# Patient Record
Sex: FEMALE | Race: WHITE | NOT HISPANIC OR LATINO
[De-identification: names, ages, dates, MRNs, and addresses within clinical notes are randomized per-mention and may not be internally consistent; named-entity substitution may affect disease eponyms.]

---

## 2019-04-08 ENCOUNTER — APPOINTMENT (OUTPATIENT)
Dept: OTOLARYNGOLOGY | Facility: CLINIC | Age: 74
End: 2019-04-08
Payer: MEDICARE

## 2019-04-08 VITALS
HEART RATE: 51 BPM | BODY MASS INDEX: 23.79 KG/M2 | HEIGHT: 61 IN | DIASTOLIC BLOOD PRESSURE: 73 MMHG | WEIGHT: 126 LBS | SYSTOLIC BLOOD PRESSURE: 131 MMHG

## 2019-04-08 DIAGNOSIS — Z87.891 PERSONAL HISTORY OF NICOTINE DEPENDENCE: ICD-10-CM

## 2019-04-08 DIAGNOSIS — J30.2 OTHER SEASONAL ALLERGIC RHINITIS: ICD-10-CM

## 2019-04-08 DIAGNOSIS — Z86.39 PERSONAL HISTORY OF OTHER ENDOCRINE, NUTRITIONAL AND METABOLIC DISEASE: ICD-10-CM

## 2019-04-08 DIAGNOSIS — Z82.3 FAMILY HISTORY OF STROKE: ICD-10-CM

## 2019-04-08 DIAGNOSIS — Z87.09 PERSONAL HISTORY OF OTHER DISEASES OF THE RESPIRATORY SYSTEM: ICD-10-CM

## 2019-04-08 PROBLEM — Z00.00 ENCOUNTER FOR PREVENTIVE HEALTH EXAMINATION: Status: ACTIVE | Noted: 2019-04-08

## 2019-04-08 PROCEDURE — 99213 OFFICE O/P EST LOW 20 MIN: CPT | Mod: 25

## 2019-04-08 PROCEDURE — 31575 DIAGNOSTIC LARYNGOSCOPY: CPT

## 2019-04-08 RX ORDER — NYSTATIN 100000 U/G
100000 OINTMENT TOPICAL
Qty: 30 | Refills: 0 | Status: ACTIVE | COMMUNITY
Start: 2019-01-23

## 2019-04-08 RX ORDER — SIMVASTATIN 20 MG/1
20 TABLET, FILM COATED ORAL
Qty: 90 | Refills: 0 | Status: ACTIVE | COMMUNITY
Start: 2019-02-21

## 2019-04-08 NOTE — REVIEW OF SYSTEMS
[Patient Intake Form Reviewed] : Patient intake form was reviewed
I have reviewed and confirmed nurses' notes for patient's medications, allergies, medical history, and surgical history.

## 2019-04-08 NOTE — CONSULT LETTER
[Dear  ___] : Dear  [unfilled], [Courtesy Letter:] : I had the pleasure of seeing your patient, [unfilled], in my office today. [Please see my note below.] : Please see my note below. [Consult Closing:] : Thank you very much for allowing me to participate in the care of this patient.  If you have any questions, please do not hesitate to contact me. [Sincerely,] : Sincerely, [FreeTextEntry3] : Kaylee Castellanos MD\par

## 2019-04-08 NOTE — ASSESSMENT
[FreeTextEntry1] : She has a left laryngeal lesion found incidentally on flexible laryngoscopy by Dr. León. She has no symptoms. It is a smooth approximately 1 cm lesion which is present on the left AE fold.  It may be a cyst or possibly lipoma.  We also discussed the small possibility of malignancy. there is no airway obstruction. She does have a history of acid reflux. She does have any symptoms at this time. She does have a history reflux of a laryngeal changes\par \par Plan\par -Findings and management options were discussed with the patient.\par -Reflux precautions and medication as needed\par -We discussed management options of the laryngeal lesion. The options were observation, imaging study, and biopsy/excision of the lesion. Although this is likely benign, malignancy cannot be ruled out without a biopsy. We  are going to start by obtaining a CT scan of the neck/larynx. I will see her back after the scan and we will  discuss further treatment.  \par -she should call and return urgently if she has any problems. \par

## 2019-04-08 NOTE — HISTORY OF PRESENT ILLNESS
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 It was found on exam by Dr. León. The patient denies voice change, throat pain, cough, dysphagia or bleeding.  She had a sinus drip when she saw Dr. León.  She does not sing or use her voice excessively.  She does not smoke.  She had reflux in the past but does not have any symptoms. She controls it with diet. 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

## 2019-05-06 ENCOUNTER — APPOINTMENT (OUTPATIENT)
Dept: OTOLARYNGOLOGY | Facility: CLINIC | Age: 74
End: 2019-05-06
Payer: MEDICARE

## 2019-05-06 VITALS
SYSTOLIC BLOOD PRESSURE: 151 MMHG | HEART RATE: 69 BPM | WEIGHT: 126 LBS | DIASTOLIC BLOOD PRESSURE: 86 MMHG | HEIGHT: 61 IN | BODY MASS INDEX: 23.79 KG/M2

## 2019-05-06 PROCEDURE — 31575 DIAGNOSTIC LARYNGOSCOPY: CPT

## 2019-05-06 PROCEDURE — 99213 OFFICE O/P EST LOW 20 MIN: CPT | Mod: 25

## 2019-05-06 RX ORDER — CALCIUM/PHOS/GENIST/D3/ZN/K 500MG-70MG
CAPSULE ORAL
Refills: 0 | Status: ACTIVE | COMMUNITY

## 2019-05-06 RX ORDER — SIMVASTATIN 20 MG/1
20 TABLET, FILM COATED ORAL
Refills: 0 | Status: DISCONTINUED | COMMUNITY
End: 2019-05-06

## 2019-05-06 RX ORDER — TRIAMCINOLONE ACETONIDE 1 MG/G
0.1 OINTMENT TOPICAL
Qty: 30 | Refills: 0 | Status: ACTIVE | COMMUNITY
Start: 2019-01-23

## 2019-05-06 NOTE — HISTORY OF PRESENT ILLNESS
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 It was found on exam by Dr. León. The patient denies voice change, throat pain, cough, dysphagia or bleeding.  She had a sinus drip when she saw Dr. León.  She does not sing or use her voice excessively.  She does not smoke.  She had reflux in the past but does not have any symptoms. She controls it with diet. 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 She had a CT scan. I reviewed the results with the patient and her daughter. There were no suspicious masses or lesions. There is a 7 mm cyst  along the posterior wall at the junction of the oropharynx and hypopharynx with benign features.  The thyroid gland was heterogeneous and enlarged.

## 2019-05-06 NOTE — CONSULT LETTER
[Courtesy Letter:] : I had the pleasure of seeing your patient, [unfilled], in my office today. [Dear  ___] : Dear  [unfilled], [Please see my note below.] : Please see my note below. [FreeTextEntry3] : Kaylee Castellanos MD\par  [Sincerely,] : Sincerely, [Consult Closing:] : Thank you very much for allowing me to participate in the care of this patient.  If you have any questions, please do not hesitate to contact me.

## 2019-05-06 NOTE — ASSESSMENT
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 There is no airway obstruction. She has symptoms due to reflux. CT scan showed no suspicious masses.\par \par Plan\par -Findings and management options were discussed with the patient and her daughter. \par -Reflux precautions and medication as needed\par -We discussed management options of the laryngeal lesion include observation, excision of the lesion in the OR and in-office biopsy. Although this is likely benign, malignancy cannot be ruled out without a biopsy. I reviewed some of the risks of observation which include set enlargement with airway obstruction obstruction and the small possibility of malignancy. She is going to think it over. I asked her to consider an in-office biopsy. I gave her the name of two physicians who perform this procedure.\par -follow up in approximately 2 months if she opts for observation. She was told to see an ENT where she will be (she will be out of the city) if she has any concerns. \par -She will speak with Dr. Beltre about repeating her TFTs.  consider ultrasound of the thyroid if needed. \par -she should call and return urgently if she has any problems. \par

## 2019-11-05 ENCOUNTER — APPOINTMENT (OUTPATIENT)
Dept: OTOLARYNGOLOGY | Facility: CLINIC | Age: 74
End: 2019-11-05
Payer: MEDICARE

## 2019-11-05 PROCEDURE — 31231 NASAL ENDOSCOPY DX: CPT

## 2019-11-05 PROCEDURE — 99213 OFFICE O/P EST LOW 20 MIN: CPT | Mod: 25

## 2019-11-05 NOTE — ASSESSMENT
[FreeTextEntry1] : She has an approximately 1 cm benign appearing left laryngeal cyst. Her airway is patent. Her exam is unchanged.\par \par She has chronic vasomotor rhinitis and reflux. She may also suffer from allergies. There is no obvious infection on exam. She also suffers from postnasal drip.\par \par Plan\par -Findings and management options were discussed with the patient  \par -Reflux precautions, elevation of the head of bed at night and pepcid\par -trial of Atrovent nasal spray. She could be a candidate for the Clarifix procedure if she responds to the medication\par -consider repeat allergy evaluation\par -allergy precautions and medication as needed. \par -We again discussed management options of the laryngeal cyst. The options include observation, excision of the lesion in the OR and in-office biopsy. Although this is likely benign, malignancy cannot be ruled out without a biopsy. I had also discussed the possibility of sudden enlargement with complications due to that her last visit. She would like to continue with observation. \par -follow up in 3 weeks. \par -she should call and return urgently if she has any problems. \par

## 2020-12-07 ENCOUNTER — APPOINTMENT (OUTPATIENT)
Dept: OTOLARYNGOLOGY | Facility: CLINIC | Age: 75
End: 2020-12-07
Payer: MEDICARE

## 2020-12-07 VITALS — WEIGHT: 125 LBS | BODY MASS INDEX: 23.6 KG/M2 | HEIGHT: 61 IN | TEMPERATURE: 97 F

## 2020-12-07 PROCEDURE — 31231 NASAL ENDOSCOPY DX: CPT

## 2020-12-07 PROCEDURE — 99213 OFFICE O/P EST LOW 20 MIN: CPT | Mod: 25

## 2020-12-07 PROCEDURE — 99072 ADDL SUPL MATRL&STAF TM PHE: CPT

## 2020-12-07 RX ORDER — IPRATROPIUM BROMIDE 21 UG/1
0.03 SPRAY NASAL
Qty: 1 | Refills: 3 | Status: DISCONTINUED | COMMUNITY
Start: 2019-11-05 | End: 2020-12-07

## 2020-12-07 NOTE — ASSESSMENT
[FreeTextEntry1] : She has had nasal congestion, nasal obstruction, and red itchy throat. Nasal endoscopy was unremarkable. There was no obvious sinus infection. Her throat exam was also normal. She does have a history of reflux and has reflux related laryngeal changes on [place]. She also has a stable laryngeal cyst.\par \par Plan\par -Findings and management options were discussed with the patient  \par -Reflux precautions, elevation of the head of bed at night \par -she will take pepcid daily for 2-3 weeks to see if that helps. \par - Atrovent nasal spray as needed. She could consider the Clarifix procedure since it does help. She is going to hold off on procedures for now\par -hydration and humidification\par -avoid alcohol based mouthwashes\par -consider repeat allergy evaluation\par -allergy precautions and medication as needed. \par -We discussed treating her empirically with antibiotics and possibly obtaining a sinus CT scan for her sinus symptoms. She would like to hold off on antibiotics. \par -We again discussed management options of the laryngeal cyst. I discussed the risk/benefits of observation (possible enlargement with airway compromise and possible malignancy). She could consider evaluation for an office-based removal of the cyst or consider removal in the operating room. Although it appears benign, the only way to rule out malignancy would be to biopsy it. She would like to continue with observation for now\par -follow up after the allergy evaluation \par -she should call and return urgently if she has any problems. \par

## 2020-12-07 NOTE — HISTORY OF PRESENT ILLNESS
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 It was found on exam by Dr. León. The patient denies voice change, throat pain, cough, dysphagia or bleeding.  She had a sinus drip when she saw Dr. León.  She does not sing or use her voice excessively.  She does not smoke.  She had reflux in the past but does not have any symptoms. She controls it with diet. 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 She had a CT scan. I reviewed the results with the patient and her daughter. There were no suspicious masses or lesions. There is a 7 mm cyst  along the posterior wall at the junction of the oropharynx and hypopharynx with benign features.  The thyroid gland was heterogeneous and enlarged.\par \par 11/5/19- Lisbeth Otoole Is here for follow up for a left laryngeal cyst. She was doing well until she developed postnasal drip, throat clearing, hoarseness, cough, and itching in the throat about 3 weeks ago. She has symptoms when she comes in from the cold and sometimes after she eats. She has a history of reflux but is not currently on medication. 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 She is not using a nasal steroid spray or allergy medication. She may have had allergy testing a few years ago. She opted for observation of the laryngeal cyst. she also had an enlarged thyroid which was heterogeneous on the CT scan. \par \par 12/7/02- LISBETH OTOOLE is a 75 year patient

## 2021-03-24 ENCOUNTER — RX RENEWAL (OUTPATIENT)
Age: 76
End: 2021-03-24

## 2021-10-26 ENCOUNTER — APPOINTMENT (OUTPATIENT)
Dept: OTOLARYNGOLOGY | Facility: CLINIC | Age: 76
End: 2021-10-26
Payer: MEDICARE

## 2021-10-26 VITALS — TEMPERATURE: 98 F | BODY MASS INDEX: 23.6 KG/M2 | HEIGHT: 61 IN | WEIGHT: 125 LBS

## 2021-10-26 PROCEDURE — 31231 NASAL ENDOSCOPY DX: CPT

## 2021-10-26 PROCEDURE — 99214 OFFICE O/P EST MOD 30 MIN: CPT | Mod: 25

## 2021-10-26 RX ORDER — AZELASTINE HYDROCHLORIDE 137 UG/1
0.1 SPRAY, METERED NASAL TWICE DAILY
Qty: 1 | Refills: 3 | Status: ACTIVE | COMMUNITY
Start: 2021-10-26 | End: 1900-01-01

## 2021-10-26 RX ORDER — CHROMIUM 200 MCG
TABLET ORAL
Refills: 0 | Status: DISCONTINUED | COMMUNITY
End: 2021-10-26

## 2021-10-26 RX ORDER — FAMOTIDINE 20 MG/1
20 TABLET, FILM COATED ORAL
Qty: 60 | Refills: 3 | Status: DISCONTINUED | COMMUNITY
Start: 2020-12-07 | End: 2021-10-26

## 2021-10-26 NOTE — HISTORY OF PRESENT ILLNESS
[de-identified] : IRENA OTOOLE is a 76 year patient With a history of chronic rhinitis, reflux, and laryngeal cyst. She is here with reflex exacerbation as well as nasal symptoms. [place]  She [place] a lot. She has had nasal congestion, nasal obstruction, and ear pressure. She has also had reflux-related symptoms. She started using Pepcid about one week ago. She uses a nasal saline spray but has not been using a nasal steroid spray. She did try Atrovent  but felt it made her worse\par \par \par ENT History\par she has a history of chronic rhinitis. Atrovent made her feel worse.  She had some allergy testing with her dermatologist.  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 \par \par She has a laryngeal cyst.  CT scan showed a 7 mm cyst along the posterior wall at the junction of the oropharynx and hypopharynx with benign features.

## 2021-10-26 NOTE — ASSESSMENT
[FreeTextEntry1] : She has a history of chronic rhinitis and reflux. She has exacerbation of her conditions. I did not see an obvious infection on nasal endoscopy and flexible laryngoscopy. She has mild nasal mucosal edema and reflux  related laryngeal changes. The laryngeal cyst is stable\par \par Plan\par -Findings and management options were discussed with the patient  \par -Reflux precautions \par -continue pepcid bid. If no improvement in 1-2 weeks, switch to omeprazole. I gave her a prescription\par -nasal saline rinses and antihistamine as needed\par - trial of flonase and astelin\par -consider repeat allergy evaluation\par -allergy precautions\par -we will continue to monitor the laryngeal cyst. If she would like to have it removed or biopsied, she will let me know\par -follow up in 3 weeks. \par -she should call and return urgently if she has any problems or worsening symptoms\par

## 2021-11-18 ENCOUNTER — NON-APPOINTMENT (OUTPATIENT)
Age: 76
End: 2021-11-18

## 2021-11-23 ENCOUNTER — TRANSCRIPTION ENCOUNTER (OUTPATIENT)
Age: 76
End: 2021-11-23

## 2021-11-23 ENCOUNTER — APPOINTMENT (OUTPATIENT)
Dept: OTOLARYNGOLOGY | Facility: CLINIC | Age: 76
End: 2021-11-23
Payer: MEDICARE

## 2021-11-23 VITALS — BODY MASS INDEX: 23.41 KG/M2 | HEIGHT: 61 IN | TEMPERATURE: 96.5 F | WEIGHT: 124 LBS

## 2021-11-23 PROCEDURE — 92567 TYMPANOMETRY: CPT

## 2021-11-23 PROCEDURE — 99214 OFFICE O/P EST MOD 30 MIN: CPT

## 2021-11-23 PROCEDURE — 92557 COMPREHENSIVE HEARING TEST: CPT

## 2021-11-23 RX ORDER — FAMOTIDINE 10 MG/1
TABLET, FILM COATED ORAL
Refills: 0 | Status: DISCONTINUED | COMMUNITY
End: 2021-11-23

## 2021-11-23 RX ORDER — OMEPRAZOLE 40 MG/1
40 CAPSULE, DELAYED RELEASE ORAL
Qty: 30 | Refills: 3 | Status: DISCONTINUED | COMMUNITY
Start: 2021-10-26 | End: 2021-11-23

## 2021-11-24 RX ORDER — SODIUM SULFATE, MAGNESIUM SULFATE, AND POTASSIUM CHLORIDE 17.75; 2.7; 2.25 G/1; G/1; G/1
1479-225-188 TABLET ORAL
Qty: 24 | Refills: 0 | Status: ACTIVE | COMMUNITY
Start: 2021-11-05

## 2021-11-24 RX ORDER — FAMOTIDINE 40 MG/1
TABLET, FILM COATED ORAL
Refills: 0 | Status: ACTIVE | COMMUNITY

## 2021-11-24 NOTE — ASSESSMENT
[FreeTextEntry1] : She has chronic rhinitis and reflux. She had allergy testing. She tried a nasal steroid spray and antihistamine. She is undergoing GI testing with Dr. Beltre. 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 \par \par Plan\par -Findings and management options were discussed with the patient  \par -good aural hygiene\par -annual audiogram\par -Reflux precautions. continue medication for reflux. \par -nasal saline rinses and antihistamine as needed\par -astelin as needed. \par -trial of Breathe right strips\par -allergy precautions\par -she is interested in serology testing for food allergies. She will be sent for this. \par -CT scan of the sinuses.  She could consider nasal procedures to improve her breathing.  \par -continue to monitor the laryngeal cyst. \par -follow up after the CT. she may call for the lab results.  \par -she should call and return urgently if she has any problems or worsening symptoms\par

## 2021-11-24 NOTE — HISTORY OF PRESENT ILLNESS
[de-identified] : IRENA OTOOLE is a 76 year patient here for follow up. She has ear pressure and tinnitus. She also has a history of chronic rhinitis, reflux, and laryngeal cyst. She has intermittent exacerbation of her symptoms. She tried Flonase, Astelin, and antihistamines. She did not like the Flonase. She is not sure if the Astelin or the antihistamine helps. She is undergoing GI workup with Dr. Beltre. She is off the PPI. She is on famotidine and trying to alter her diet. She said her thyroid labs were normal.  She has TMJD and uses a nightguard. She had been gardening a lot. \par \par She saw Dr. Gongora and had positive allergy testing to trees, weeks, cat and mouse. \par \par She has a history of tinnitus.  \par \par ENT history\par She has a history of chronic rhinitis. Atrovent made her feel worse. She had some allergy testing with her dermatologist. 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 \par \par She has a laryngeal cyst. CT scan showed a 7 mm cyst along the posterior wall at the junction of the oropharynx and hypopharynx with benign features

## 2021-11-27 ENCOUNTER — APPOINTMENT (OUTPATIENT)
Dept: CT IMAGING | Facility: HOSPITAL | Age: 76
End: 2021-11-27
Payer: MEDICARE

## 2021-11-27 ENCOUNTER — OUTPATIENT (OUTPATIENT)
Dept: OUTPATIENT SERVICES | Facility: HOSPITAL | Age: 76
LOS: 1 days | End: 2021-11-27
Payer: MEDICARE

## 2021-11-27 ENCOUNTER — RESULT REVIEW (OUTPATIENT)
Age: 76
End: 2021-11-27

## 2021-11-27 PROCEDURE — 70486 CT MAXILLOFACIAL W/O DYE: CPT | Mod: 26

## 2021-11-27 PROCEDURE — 70486 CT MAXILLOFACIAL W/O DYE: CPT

## 2021-12-02 ENCOUNTER — NON-APPOINTMENT (OUTPATIENT)
Age: 76
End: 2021-12-02

## 2021-12-08 ENCOUNTER — APPOINTMENT (OUTPATIENT)
Dept: OTOLARYNGOLOGY | Facility: CLINIC | Age: 76
End: 2021-12-08
Payer: MEDICARE

## 2021-12-08 VITALS — TEMPERATURE: 94.6 F | BODY MASS INDEX: 23.41 KG/M2 | HEIGHT: 61 IN | WEIGHT: 124 LBS

## 2021-12-08 DIAGNOSIS — J30.0 VASOMOTOR RHINITIS: ICD-10-CM

## 2021-12-08 PROCEDURE — 99213 OFFICE O/P EST LOW 20 MIN: CPT

## 2021-12-08 RX ORDER — FLUTICASONE PROPIONATE 50 UG/1
50 SPRAY, METERED NASAL DAILY
Qty: 1 | Refills: 3 | Status: DISCONTINUED | COMMUNITY
Start: 2021-10-26 | End: 2021-12-08

## 2021-12-08 RX ORDER — LORATADINE 10 MG/1
10 TABLET ORAL
Qty: 30 | Refills: 0 | Status: DISCONTINUED | COMMUNITY
Start: 2021-11-15 | End: 2021-12-08

## 2021-12-08 NOTE — ASSESSMENT
[FreeTextEntry1] : She is feeling better. She has mild nasal and sinus symptoms. We reviewed her CT scan and food allergy test results.\par \par She has reflux. She still has symptoms due to that\par \par Plan\par -Findings and management options were discussed with the patient  \par -annual audiogram\par -Reflux precautions. continue medication for reflux. She may try omeprazole again if she feels the pepcid is not helping. I asked her to speak with Dr. Beltre as well. \par -nasal saline rinses and antihistamine as needed\par -astelin as needed. \par -trial of Breathe right strips- she has not yet tried them\par -allergy precautions. She should wear a mask when gardening.  I asked her to speak with Dr. Gongora about the food allergy test results. The positive test may not indicate significant allergy\par -She may consider nasal procedures to improve her breathing. She is not interested in surgery at this time\par -continue to monitor the laryngeal cyst. I did discuss the small possibility that the cyst could be something more concerning. She would like to continue with observation. She does not want a biopsy at this time\par -follow up  in 3-6 months if she is doing well. \par -she should call and return urgently if she has any problems or worsening symptoms\par

## 2021-12-08 NOTE — HISTORY OF PRESENT ILLNESS
[de-identified] : IRENA OTOOLE is a 76 year patient is here for followup. She is feeling better. The pressure over the sinuses has improved. She does have nasal congestion after eating but she is breathing better. She is using Astelin as needed. She stopped the antihistamine. She still having reflux-related symptoms. She is on Pepcid. She thinks she may want to try omeprazole again. She said her abdominal ultrasound was negative. She had serology testing for food allergies. There was very mild allergy to cow's mild and shrimp. We reviewed the sinus CT scan. There was very mild mucosal thickening in the sinuses with a deviated septum, bilateral middle turbinate chris bullosa, and narrow/partially obstructed OMCs.\par \par ENT History\par She has a history of chronic rhinitis. Atrovent made her feel worse. She did not like Flonase. She had some allergy testing with her dermatologist.\par \par She saw Dr. 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  \par \par She has a laryngeal cyst. CT scan showed a 7 mm cyst along the posterior wall at the junction of the oropharynx and hypopharynx with benign features \par \par She has TMJD and uses a nightguard\par

## 2021-12-08 NOTE — CONSULT LETTER
[Dear  ___] : Dear  [unfilled], [Courtesy Letter:] : I had the pleasure of seeing your patient, [unfilled], in my office today. [Please see my note below.] : Please see my note below. [Consult Closing:] : Thank you very much for allowing me to participate in the care of this patient.  If you have any questions, please do not hesitate to contact me. [Sincerely,] : Sincerely, [FreeTextEntry3] : Kaylee Castellanos MD\par  [DrSteven  ___] : Dr. DORSEY

## 2022-04-20 ENCOUNTER — APPOINTMENT (OUTPATIENT)
Dept: OTOLARYNGOLOGY | Facility: CLINIC | Age: 77
End: 2022-04-20
Payer: MEDICARE

## 2022-04-20 VITALS — WEIGHT: 127 LBS | TEMPERATURE: 96.9 F | HEIGHT: 61 IN | BODY MASS INDEX: 23.98 KG/M2

## 2022-04-20 DIAGNOSIS — H61.22 IMPACTED CERUMEN, LEFT EAR: ICD-10-CM

## 2022-04-20 DIAGNOSIS — H93.13 TINNITUS, BILATERAL: ICD-10-CM

## 2022-04-20 PROCEDURE — 69210 REMOVE IMPACTED EAR WAX UNI: CPT

## 2022-04-20 PROCEDURE — 99214 OFFICE O/P EST MOD 30 MIN: CPT | Mod: 25

## 2022-04-20 PROCEDURE — 31575 DIAGNOSTIC LARYNGOSCOPY: CPT

## 2022-04-20 RX ORDER — IPRATROPIUM BROMIDE 21 UG/1
0.03 SPRAY NASAL
Qty: 1 | Refills: 3 | Status: COMPLETED | COMMUNITY
Start: 2020-03-09 | End: 2022-04-20

## 2022-04-20 RX ORDER — LORATADINE 10 MG/1
10 TABLET ORAL
Qty: 90 | Refills: 3 | Status: ACTIVE | COMMUNITY
Start: 2022-04-20 | End: 1900-01-01

## 2022-04-20 RX ORDER — FAMOTIDINE 20 MG/1
20 TABLET, FILM COATED ORAL
Qty: 180 | Refills: 3 | Status: ACTIVE | COMMUNITY
Start: 2022-04-20 | End: 1900-01-01

## 2022-04-20 RX ORDER — CETIRIZINE HYDROCHLORIDE 10 MG/1
10 TABLET, COATED ORAL
Qty: 90 | Refills: 3 | Status: ACTIVE | COMMUNITY
Start: 2022-04-20 | End: 1900-01-01

## 2022-04-20 NOTE — HISTORY OF PRESENT ILLNESS
[de-identified] : IRENA OTOOLE is a 77 year old patient who is well known to me. She has allergies, chronic rhinitis, reflux and a laryngeal cyst. She has been doing well. She was in [place] for one month and had no symptoms. She returned to [place] and has had allergies. She has itchy eyes and mild nasal congestion. She uses antihistamines which help. Her reflux has been controlled. She takes pepcid.   She noticed the tinnitus in her left ear has been worse. \par \par ENT History\par She has a history of chronic rhinitis. Atrovent made her feel worse. She did not like Flonase.  Astelin may help a little. She has not tried Breathe right strips. \par \par She saw Dr. Gongora and had positive allergy testing to trees, weeks, cat and mouse\par Serology testing for allergies- mild allergy to cow's milk and shrimp\par \par Sinus CT scan in 2021. 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 \par \par She has a laryngeal cyst. CT scan in 2019 showed a 7 mm cyst along the posterior wall at the junction of the oropharynx and hypopharynx with benign features \par \par She has TMJD and uses a nightguard\par \par She has bilateral high frequency SNHL and tinnitus

## 2022-04-20 NOTE — ASSESSMENT
[FreeTextEntry1] : She has been doing well other than mild allergy symptoms. \par \par She has reflux which has been well-controlled\par \par The laryngeal cyst is stable. \par \par She had cerumen which was removed. She thinks the tinnitus may be less. \par \par Plan\par -Findings and management options were discussed with the patient  \par -annual audiogram\par -Reflux precautions and pepcid as needed \par -nasal saline rinses and antihistamine as needed\par -she may use astelin as needed and consider trying Breathe right strips (she was cautioned they can cause irritation of the skin)\par -trial of Breathe right strips- she has not yet tried them\par -allergy precautions. \par -she may consider nasal procedures to improve her breathing. She has been doing well and would like to hold off. \par -We again discussed options regarding the laryngeal cyst. It looks unchanged and is not causing airway obstruction . We discussed the small possibility of malignancy or that it may enlarge and cause airway obstruction. She is going to think it over.  She may consider surgery to remove it when she returns to [place].  \par -follow up in the fall when she returns to [place]. \par -she should call and return urgently if she has any problems or worsening symptoms\par

## 2022-08-02 RX ORDER — LORATADINE 10 MG/1
10 TABLET ORAL
Qty: 90 | Refills: 3 | Status: ACTIVE | COMMUNITY
Start: 2022-08-02 | End: 1900-01-01

## 2022-08-02 RX ORDER — CETIRIZINE HYDROCHLORIDE 10 MG/1
10 TABLET, COATED ORAL
Qty: 30 | Refills: 3 | Status: ACTIVE | COMMUNITY
Start: 2022-08-02 | End: 1900-01-01

## 2022-09-21 NOTE — HISTORY OF PRESENT ILLNESS
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 It was found on exam by Dr. León. The patient denies voice change, throat pain, cough, dysphagia or bleeding.  She had a sinus drip when she saw Dr. León.  She does not sing or use her voice excessively.  She does not smoke.  She had reflux in the past but does not have any symptoms. She controls it with diet. 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 She had a CT scan. I reviewed the results with the patient and her daughter. There were no suspicious masses or lesions. There is a 7 mm cyst  along the posterior wall at the junction of the oropharynx and hypopharynx with benign features.  The thyroid gland was heterogeneous and enlarged.\par \par 11/5/19- Lisbeth Grant Is here for follow up for a left laryngeal cyst. She was doing well until she developed postnasal drip, throat clearing, hoarseness, cough, and itching in the throat about 3 weeks ago. She has symptoms when she comes in from the cold and sometimes after she eats. She has a history of reflux but is not currently on medication. 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 She is not using a nasal steroid spray or allergy medication. She may have had allergy testing a few years ago. She opted for observation of the laryngeal cyst. she also had an enlarged thyroid which was heterogeneous on the CT scan.  Bactrim Counseling:  I discussed with the patient the risks of sulfa antibiotics including but not limited to GI upset, allergic reaction, drug rash, diarrhea, dizziness, photosensitivity, and yeast infections.  Rarely, more serious reactions can occur including but not limited to aplastic anemia, agranulocytosis, methemoglobinemia, blood dyscrasias, liver or kidney failure, lung infiltrates or desquamative/blistering drug rashes.

## 2022-11-04 ENCOUNTER — APPOINTMENT (OUTPATIENT)
Dept: OTOLARYNGOLOGY | Facility: CLINIC | Age: 77
End: 2022-11-04

## 2022-11-04 VITALS — WEIGHT: 127 LBS | HEIGHT: 61 IN | TEMPERATURE: 96.5 F | BODY MASS INDEX: 23.98 KG/M2

## 2022-11-04 DIAGNOSIS — R05.9 COUGH, UNSPECIFIED: ICD-10-CM

## 2022-11-04 PROCEDURE — 31231 NASAL ENDOSCOPY DX: CPT

## 2022-11-04 PROCEDURE — 99213 OFFICE O/P EST LOW 20 MIN: CPT | Mod: 25

## 2022-11-04 NOTE — HISTORY OF PRESENT ILLNESS
[de-identified] : Caity OTOOLE is a 77 year old patient with a history of allergies, chronic rhinitis, reflux, and laryngeal cyst here for a cough.  [place] and felt great.  She essentially had no symptoms.  She returned to [place] in the fall and developed her seasonal allergies as well as some reflux related symptoms.  At the end of September she had a COVID test which was negative.  She then went on a trip to [place].  While there, she developed a cough.  She did not feel very sick.  She tried nasal saline spray and cough expectorant without too much improvement.  Several of her family members did develop upper respiratory tract infections.  She has no throat pain or dysphagia.  She does have mild hoarseness, nasal congestion, nasal drainage.  She is on Pepcid for reflux.  She tried Astelin in the past but did not notice that much improvement.  She has not yet gotten her flu shot\par \par ENT History\par She has a history of chronic rhinitis. Atrovent made her feel worse. She did not like Flonase. Astelin may help a little. She has not tried Breathe right strips. \par \par She saw Dr. Gongora and had positive allergy testing to trees, weeks, cat and mouse\par Serology testing for allergies- mild allergy to cow's milk and shrimp\par \par Sinus CT scan in 2021. 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 \par \par She has a laryngeal cyst. CT scan in 2019 showed a 7 mm cyst along the posterior wall at the junction of the oropharynx and hypopharynx with benign features \par \par She has TMJD and uses a nightguard\par \par She has bilateral high frequency SNHL and tinnitus

## 2022-11-04 NOTE — ASSESSMENT
[FreeTextEntry1] : She has had a cough which developed while she was traveling.  She does not feel sick but does have nasal congestion and intermittent hoarseness.  She may have a viral infection.  There is no obvious sinus infection seen on nasal endoscopy.  Her laryngeal exam is stable.  She may also have allergies and reflux exacerbation.\par \par Plan\par -Findings and management options were discussed with the patient  \par -Hydration and salt water gargles as needed\par -Reflux precautions and pepcid as needed \par -nasal saline rinses and antihistamine as needed.  She may use an over-the-counter cough medication\par -She was asked to call Monday for the culture results.\par -I have asked her to speak with her PCP to go to urgent care for COVID testing.  She may also need to be tested for the flu.  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  When she returns, we will discuss this further\par -I recommend follow-up in 2 to 3 weeks\par -she should call and return urgently if she has any problems or worsening symptoms\par

## 2022-11-07 LAB — BACTERIA THROAT CULT: NORMAL

## 2024-02-05 ENCOUNTER — APPOINTMENT (OUTPATIENT)
Dept: OTOLARYNGOLOGY | Facility: CLINIC | Age: 79
End: 2024-02-05
Payer: MEDICARE

## 2024-02-05 DIAGNOSIS — J34.89 OTHER SPECIFIED DISORDERS OF NOSE AND NASAL SINUSES: ICD-10-CM

## 2024-02-05 DIAGNOSIS — Z91.09 OTHER ALLERGY STATUS, OTHER THAN TO DRUGS AND BIOLOGICAL SUBSTANCES: ICD-10-CM

## 2024-02-05 DIAGNOSIS — H90.3 SENSORINEURAL HEARING LOSS, BILATERAL: ICD-10-CM

## 2024-02-05 DIAGNOSIS — K21.9 GASTRO-ESOPHAGEAL REFLUX DISEASE W/OUT ESOPHAGITIS: ICD-10-CM

## 2024-02-05 DIAGNOSIS — J31.0 CHRONIC RHINITIS: ICD-10-CM

## 2024-02-05 DIAGNOSIS — J38.3 OTHER DISEASES OF VOCAL CORDS: ICD-10-CM

## 2024-02-05 DIAGNOSIS — J34.2 DEVIATED NASAL SEPTUM: ICD-10-CM

## 2024-02-05 PROCEDURE — 31575 DIAGNOSTIC LARYNGOSCOPY: CPT

## 2024-02-05 PROCEDURE — 92557 COMPREHENSIVE HEARING TEST: CPT

## 2024-02-05 PROCEDURE — 99213 OFFICE O/P EST LOW 20 MIN: CPT | Mod: 25

## 2024-02-05 PROCEDURE — 92567 TYMPANOMETRY: CPT

## 2024-02-05 NOTE — CONSULT LETTER
[Dear  ___] : Dear  [unfilled], [Consult Letter:] : I had the pleasure of evaluating your patient, [unfilled]. [Please see my note below.] : Please see my note below. [Consult Closing:] : Thank you very much for allowing me to participate in the care of this patient.  If you have any questions, please do not hesitate to contact me. [Sincerely,] : Sincerely, [FreeTextEntry3] : Kaylee Castellanos MD [facility] at [facility] Ot[facility]  Head & Neck Surgery [facility]   Department of Otolaryngology [facility] [employer] [facility] at [facility]  Office Tel: [phone]

## 2024-02-05 NOTE — HISTORY OF PRESENT ILLNESS
[de-identified] : IRENA OTOOLE is a 79 year old patient Here for follow-up.  She has a history of high-frequency hearing loss, allergies, chronic rhinitis, reflux, and laryngeal cyst.  She had been doing well when she was [facility].  When she returned to the [facility] in the fall, she had nasal and sinus symptoms in October, November, and December.  She was having nasal congestion and drainage.  She was also having reflux exacerbation.  She uses an antihistamine and Astelin as needed.  She does find that the Astelin has been helping.  She has no sinus pain or pressure.  She has no dysphagia or throat pain.  She also takes Pepcid for reflux.  ENT History She has a history of chronic rhinitis. Atrovent made her feel worse. She did not like Flonase. Astelin has been helping. She is not sure if she tried Breathe right strips.  She saw Dr. Gongora and had positive allergy testing to trees, weeds, cat and mouse Serology testing for allergies- mild allergy to cow's milk and shrimp  Sinus CT scan in 2021. 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  She has a laryngeal cyst. CT scan in 2019 showed a 7 mm cyst along the posterior wall at the junction of the oropharynx and hypopharynx with benign features  She has TMJD and uses a nightguard  She has bilateral high frequency SNHL and tinnitus

## 2024-02-05 NOTE — ASSESSMENT
[FreeTextEntry1] : She has mild bilateral sensorineural hearing loss.  We reviewed her audiogram  She has a history of chronic rhinosinusitis and allergies.  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 -Continue good ear hygiene -Noise precautions -Monitor hearing -Astelin as needed -She may consider repeat allergy evaluation -Continue reflux precautions and Pepcid as needed -She wishes to continue with observation of laryngeal cyst.  We had discussed excision in the past. She understands the risk/benefits of observation. -If she is doing well, I will see her back in 6 months -She should call and return earlier if any concerns or worsening symptoms

## 2024-10-28 ENCOUNTER — APPOINTMENT (OUTPATIENT)
Dept: OTOLARYNGOLOGY | Facility: CLINIC | Age: 79
End: 2024-10-28
Payer: MEDICARE

## 2024-10-28 ENCOUNTER — NON-APPOINTMENT (OUTPATIENT)
Age: 79
End: 2024-10-28

## 2024-10-28 VITALS — BODY MASS INDEX: 23.79 KG/M2 | HEIGHT: 61 IN | WEIGHT: 126 LBS

## 2024-10-28 DIAGNOSIS — J31.0 CHRONIC RHINITIS: ICD-10-CM

## 2024-10-28 DIAGNOSIS — J34.2 DEVIATED NASAL SEPTUM: ICD-10-CM

## 2024-10-28 DIAGNOSIS — J38.3 OTHER DISEASES OF VOCAL CORDS: ICD-10-CM

## 2024-10-28 DIAGNOSIS — J34.89 OTHER SPECIFIED DISORDERS OF NOSE AND NASAL SINUSES: ICD-10-CM

## 2024-10-28 DIAGNOSIS — K21.9 GASTRO-ESOPHAGEAL REFLUX DISEASE W/OUT ESOPHAGITIS: ICD-10-CM

## 2024-10-28 DIAGNOSIS — Z91.09 OTHER ALLERGY STATUS, OTHER THAN TO DRUGS AND BIOLOGICAL SUBSTANCES: ICD-10-CM

## 2024-10-28 DIAGNOSIS — H90.3 SENSORINEURAL HEARING LOSS, BILATERAL: ICD-10-CM

## 2024-10-28 PROCEDURE — 31575 DIAGNOSTIC LARYNGOSCOPY: CPT

## 2024-10-28 PROCEDURE — 99213 OFFICE O/P EST LOW 20 MIN: CPT | Mod: 25

## 2024-10-28 RX ORDER — FEXOFENADINE HCL 60 MG
CAPSULE ORAL
Refills: 0 | Status: ACTIVE | COMMUNITY

## 2024-10-28 RX ORDER — RALOXIFENE HYDROCHLORIDE 60 MG/1
TABLET, FILM COATED ORAL
Refills: 0 | Status: ACTIVE | COMMUNITY

## 2025-04-25 ENCOUNTER — APPOINTMENT (OUTPATIENT)
Dept: ORTHOPEDIC SURGERY | Facility: CLINIC | Age: 80
End: 2025-04-25

## 2025-04-25 VITALS — BODY MASS INDEX: 24.55 KG/M2 | HEIGHT: 61 IN | WEIGHT: 130 LBS

## 2025-04-25 DIAGNOSIS — M65.342 TRIGGER FINGER, LEFT RING FINGER: ICD-10-CM

## 2025-04-25 DIAGNOSIS — M65.331 TRIGGER FINGER, RIGHT MIDDLE FINGER: ICD-10-CM

## 2025-04-25 PROCEDURE — 73120 X-RAY EXAM OF HAND: CPT | Mod: 50

## 2025-04-25 PROCEDURE — 20550 NJX 1 TENDON SHEATH/LIGAMENT: CPT | Mod: 50

## 2025-04-25 PROCEDURE — 99204 OFFICE O/P NEW MOD 45 MIN: CPT | Mod: 25
